# Patient Record
Sex: FEMALE | Race: WHITE
[De-identification: names, ages, dates, MRNs, and addresses within clinical notes are randomized per-mention and may not be internally consistent; named-entity substitution may affect disease eponyms.]

---

## 2017-01-08 ENCOUNTER — HOSPITAL ENCOUNTER (EMERGENCY)
Dept: HOSPITAL 17 - PHED | Age: 77
LOS: 1 days | Discharge: HOME | End: 2017-01-09
Payer: MEDICARE

## 2017-01-08 VITALS
SYSTOLIC BLOOD PRESSURE: 138 MMHG | DIASTOLIC BLOOD PRESSURE: 58 MMHG | HEART RATE: 86 BPM | OXYGEN SATURATION: 99 % | RESPIRATION RATE: 17 BRPM

## 2017-01-08 VITALS
HEART RATE: 125 BPM | RESPIRATION RATE: 18 BRPM | TEMPERATURE: 99.8 F | SYSTOLIC BLOOD PRESSURE: 131 MMHG | DIASTOLIC BLOOD PRESSURE: 76 MMHG | OXYGEN SATURATION: 96 %

## 2017-01-08 VITALS — BODY MASS INDEX: 27.06 KG/M2 | HEIGHT: 62 IN | WEIGHT: 147.05 LBS

## 2017-01-08 VITALS
RESPIRATION RATE: 16 BRPM | HEART RATE: 101 BPM | DIASTOLIC BLOOD PRESSURE: 56 MMHG | OXYGEN SATURATION: 97 % | SYSTOLIC BLOOD PRESSURE: 112 MMHG

## 2017-01-08 VITALS — OXYGEN SATURATION: 98 %

## 2017-01-08 VITALS — TEMPERATURE: 99.5 F

## 2017-01-08 DIAGNOSIS — B34.9: Primary | ICD-10-CM

## 2017-01-08 DIAGNOSIS — E86.0: ICD-10-CM

## 2017-01-08 LAB
ALP SERPL-CCNC: 53 U/L (ref 45–117)
ALT SERPL-CCNC: 30 U/L (ref 10–53)
ANION GAP SERPL CALC-SCNC: 11 MEQ/L (ref 5–15)
AST SERPL-CCNC: 26 U/L (ref 15–37)
BASOPHILS # BLD AUTO: 0 TH/MM3 (ref 0–0.2)
BASOPHILS NFR BLD: 0.3 % (ref 0–2)
BILIRUB SERPL-MCNC: 1.1 MG/DL (ref 0.2–1)
BUN SERPL-MCNC: 15 MG/DL (ref 7–18)
CHLORIDE SERPL-SCNC: 103 MEQ/L (ref 98–107)
EOSINOPHIL # BLD: 0 TH/MM3 (ref 0–0.4)
EOSINOPHIL NFR BLD: 0.3 % (ref 0–4)
ERYTHROCYTE [DISTWIDTH] IN BLOOD BY AUTOMATED COUNT: 13.9 % (ref 11.6–17.2)
GFR SERPLBLD BASED ON 1.73 SQ M-ARVRAT: 61 ML/MIN (ref 89–?)
HCO3 BLD-SCNC: 28.6 MEQ/L (ref 21–32)
HCT VFR BLD CALC: 46.5 % (ref 35–46)
HEMO FLAGS: (no result)
LYMPHOCYTES # BLD AUTO: 0.7 TH/MM3 (ref 1–4.8)
LYMPHOCYTES NFR BLD AUTO: 10.3 % (ref 9–44)
MCH RBC QN AUTO: 30.1 PG (ref 27–34)
MCHC RBC AUTO-ENTMCNC: 33 % (ref 32–36)
MCV RBC AUTO: 91.3 FL (ref 80–100)
MONOCYTES NFR BLD: 5.5 % (ref 0–8)
NEUTROPHILS # BLD AUTO: 5.7 TH/MM3 (ref 1.8–7.7)
NEUTROPHILS NFR BLD AUTO: 83.6 % (ref 16–70)
PLATELET # BLD: 206 TH/MM3 (ref 150–450)
POTASSIUM SERPL-SCNC: 3.8 MEQ/L (ref 3.5–5.1)
RBC # BLD AUTO: 5.1 MIL/MM3 (ref 4–5.3)
SODIUM SERPL-SCNC: 143 MEQ/L (ref 136–145)
WBC # BLD AUTO: 6.8 TH/MM3 (ref 4–11)

## 2017-01-08 PROCEDURE — 96374 THER/PROPH/DIAG INJ IV PUSH: CPT

## 2017-01-08 PROCEDURE — 96361 HYDRATE IV INFUSION ADD-ON: CPT

## 2017-01-08 PROCEDURE — 83690 ASSAY OF LIPASE: CPT

## 2017-01-08 PROCEDURE — 81001 URINALYSIS AUTO W/SCOPE: CPT

## 2017-01-08 PROCEDURE — 96375 TX/PRO/DX INJ NEW DRUG ADDON: CPT

## 2017-01-08 PROCEDURE — 96376 TX/PRO/DX INJ SAME DRUG ADON: CPT

## 2017-01-08 PROCEDURE — 99284 EMERGENCY DEPT VISIT MOD MDM: CPT

## 2017-01-08 PROCEDURE — 85025 COMPLETE CBC W/AUTO DIFF WBC: CPT

## 2017-01-08 PROCEDURE — 80053 COMPREHEN METABOLIC PANEL: CPT

## 2017-01-08 PROCEDURE — 87804 INFLUENZA ASSAY W/OPTIC: CPT

## 2017-01-08 NOTE — PD
HPI


Chief Complaint:  GI Complaint


Time Seen by Provider:  22:14


Travel History


International Travel<30 days:  No


Contact w/Intl Traveler<30days:  No


Traveled to known affect area:  No





History of Present Illness


HPI


The patient is a 76-year-old female that states she has been having nausea and 

vomiting since 2 PM today.  She feels that she has had a fever, she has chills 

and feels cold.  She does have generalized myalgias.  She denies any abdominal 

pain.  She denies any dysuria, frequency or urgency.  She does feel dehydrated.





PFSH


Past Medical History


Heart Rhythm Problems:  No


Cancer:  No


Cardiac Catheterization:  No


Cardiovascular Problems:  Yes (HX OF CARDIAC CATH, ? CARDIOMYOPATHY, LBBB ON EKG

)


High Cholesterol:  No


Congestive Heart Failure:  No


Diabetes:  No


Diminished Hearing:  No


Endocrine:  No


Gastrointestinal Disorders:  Yes (ACID REFLUX, CELIAC DISEASE--GLUTEN FREE)


Glaucoma:  Yes (BILATERAL )


Genitourinary:  No


Hepatitis:  No


Hiatal Hernia:  No


Hypertension:  No


Immune Disorder:  Yes (CELIAC DISEASE)


Musculoskeletal:  Yes (ARTHRITIS, HELDER HAND TRIGGER FINGER, CARPAL TUNNEL HELDER 

HANDS, HELDER SHOULDERS)


Neurologic:  No


Psychiatric:  No


Reproductive:  No


Respiratory:  No


Myocardial Infarction:  Yes


Thyroid Disease:  No


Tetanus Vaccination:  Unknown


Influenza Vaccination:  No


Pregnant?:  Not Pregnant


Menopausal:  Yes


:  2





Past Surgical History


Body Medical Devices:  SCREWS IN R KNEE REMOVED


Cholecystectomy:  Yes ()


Coronary Artery Bypass Graft:  No


Eye Surgery:  Yes (ACUTE GLAUCOMA SURGERY HELDER EYES , HELDER CATARACT SURGERY)


Joint Replacement:  No


Neurologic Surgery:  Yes (BILATERAL CARPAL TUNNEL, NEUROMA REMOVED FROM PLANTAR 

SURFACE HELDER FEET)


Thoracic Surgery:  Yes (CHEST TUBE FOR R LUNG PNEUMOTHROAX ACQUIRED DURING CPR 

WHEN PT CODED )


Other Surgery:  Yes (MECHANICAL VENTILATION, TRAUMATIC R. PNEUMOTHORAX)





Family History


Family Myocardial Infarction:  Yes (mom)





Social History


Alcohol Use:  No


Tobacco Use:  No


Substance Use:  No





Allergies-Medications


(Allergen,Severity, Reaction):  


Coded Allergies:  


     No Known Allergies (Unverified , 16)


Reported Meds & Prescriptions





Reported Meds & Active Scripts


Active


Ibuprofen 600 Mg Tab 600 Mg PO TID


Reported


Protonix (Pantoprazole Sodium) 20 Mg Tab 20 Mg PO DAILY


Vitamin D-3 (Cholecalciferol) 1,000 Unit Tab 1,000 Units PO DAILY


Lipitor (Atorvastatin Calcium) 10 Mg Tab 10 Mg PO HS








Review of Systems


Except as stated in HPI:  all other systems reviewed are Neg





Physical Exam


Narrative


GENERAL: The patient is alert, oriented 3, moderately dehydrated appearing in 

moderate apparent distress with her nausea.  Her vital signs show heart rate of 

125 with temperature 99.8.


SKIN: Warm and dry.  No skin rash is seen.


HEAD: Atraumatic. Normocephalic. 


EYES: Pupils equal and round. No scleral icterus. No injection or drainage. 


ENT: No nasal bleeding or discharge.  Mucous membranes pink and moist.  The 

throat is clear and the tympanic membranes are clear.


NECK: Trachea midline. No JVD. 


CARDIOVASCULAR: Regular rate and rhythm.  No murmur appreciated.


RESPIRATORY: No accessory muscle use. Clear to auscultation. Breath sounds 

equal bilaterally. 


GASTROINTESTINAL: Abdomen soft, non-tender, nondistended. Hepatic and splenic 

margins not palpable.  No Guarding or rebound is present.  


MUSCULOSKELETAL: No obvious deformities. No clubbing.  No cyanosis.  No edema.


NEUROLOGICAL: Awake and alert. No obvious cranial nerve deficits.  Motor 

grossly within normal limits. Normal speech.


PSYCHIATRIC: Appropriate mood and affect; insight and judgment normal.





Data


Data


Last Documented VS





Vital Signs








  Date Time  Temp Pulse Resp B/P Pulse Ox O2 Delivery O2 Flow Rate FiO2


 


17 23:49  86 17 138/58 99 Room Air  


 


17 21:55 99.8       








Orders





 Complete Blood Count With Diff (17 22:33)


Comprehensive Metabolic Panel (17 22:33)


Lipase (17 22:33)


Urinalysis - C+S If Indicated (17 22:33)


Iv Access Insert/Monitor (17 22:33)


Ecg Monitoring (17 22:33)


Oximetry (17 22:33)


Ondansetron Inj (Zofran Inj) (17 22:45)


Sodium Chlor 0.9% 1000 Ml Inj (Ns 1000 M (17 22:33)


Sodium Chloride 0.9% Flush (Ns Flush) (17 22:45)


Ketorolac Inj (Toradol Inj) (17 22:45)


Influenzae A/B Antigen (17 22:53)


Sodium Chloride 0.9% Flush (Ns Flush) (17 23:00)


Sodium Chlor 0.9% 1000 Ml Inj (Ns 1000 M (17 23:30)





Labs








 Laboratory Tests








Test 17





 22:40


 


White Blood Count 6.8 TH/MM3


 


Red Blood Count 5.10 MIL/MM3


 


Hemoglobin 15.3 GM/DL


 


Hematocrit 46.5 %


 


Mean Corpuscular Volume 91.3 FL


 


Mean Corpuscular Hemoglobin 30.1 PG


 


Mean Corpuscular Hemoglobin 33.0 %





Concent 


 


Red Cell Distribution Width 13.9 %


 


Platelet Count 206 TH/MM3


 


Mean Platelet Volume 8.8 FL


 


Neutrophils (%) (Auto) 83.6 %


 


Lymphocytes (%) (Auto) 10.3 %


 


Monocytes (%) (Auto) 5.5 %


 


Eosinophils (%) (Auto) 0.3 %


 


Basophils (%) (Auto) 0.3 %


 


Neutrophils # (Auto) 5.7 TH/MM3


 


Lymphocytes # (Auto) 0.7 TH/MM3


 


Monocytes # (Auto) 0.4 TH/MM3


 


Eosinophils # (Auto) 0.0 TH/MM3


 


Basophils # (Auto) 0.0 TH/MM3


 


CBC Comment DIFF FINAL 


 


Differential Comment  


 


Sodium Level 143 MEQ/L


 


Potassium Level 3.8 MEQ/L


 


Chloride Level 103 MEQ/L


 


Carbon Dioxide Level 28.6 MEQ/L


 


Anion Gap 11 MEQ/L


 


Blood Urea Nitrogen 15 MG/DL


 


Creatinine 0.90 MG/DL


 


Estimat Glomerular Filtration 61 ML/MIN





Rate 


 


Random Glucose 131 MG/DL


 


Calcium Level 8.9 MG/DL


 


Total Bilirubin 1.1 MG/DL


 


Aspartate Amino Transf 26 U/L





(AST/SGOT) 


 


Alanine Aminotransferase 30 U/L





(ALT/SGPT) 


 


Alkaline Phosphatase 53 U/L


 


Total Protein 7.2 GM/DL


 


Albumin 3.9 GM/DL


 


Lipase 155 U/L














MDM


Medical Decision Making


Medical Screen Exam Complete:  Yes


Emergency Medical Condition:  Yes


Medical Record Reviewed:  Yes


Interpretation(s)


The CBC is normal.  The hemoglobin is 15.3 and hematocrit 46.5 and this likely 

represents some hemoconcentration from dehydration.  The complete metabolic 

profile shows a GFR of 61, glucose 131 and total bilirubin 1.1 but is otherwise 

normal.  The lipase is normal.


Differential Diagnosis


Viral syndrome, gastroenteritis, gastritis, dehydration, electrolyte disorder, 

anemia, hypo-/hyperglycemia, colitis


Narrative Course


The patient has drank 2 glasses of water/Gatorade and is at 2 L of IV fluid.  

Despite this, she is not able to make a urine yet.  She apparently was 

dehydrated.  The hemoglobin and hematocrit likely reflect some 

hemoconcentration from dehydration.





Impression: Viral syndrome, moderate dehydration





Sepsis Criteria


SIRS Criteria (2 or more):  Heart rate over 90





Diagnosis





 Primary Impression:  


 Viral syndrome


 Additional Impression:  


 Moderate dehydration





***Additional Instructions:


Take the Motrin one tablet 3 times daily for the aches and pains.  Follow-up 

with your primary care physician this week.


***Med/Other Pt SpecificInfo:  Prescription(s) given


Scripts


Ibuprofen 600 Mg [id number] Mg PO TID  #45 TAB  Ref 0


   Prov:Estevan Huffman MD         17


Disposition:  01 DISCHARGE HOME


Condition:  Stable








Estevan Huffmna MD 2017 22:44

## 2017-01-09 VITALS — SYSTOLIC BLOOD PRESSURE: 116 MMHG | DIASTOLIC BLOOD PRESSURE: 69 MMHG

## 2017-01-09 LAB
COLOR UR: YELLOW
COMMENT (UR): (no result)
CULTURE IF INDICATED: (no result)
GLUCOSE UR STRIP-MCNC: (no result) MG/DL
HGB UR QL STRIP: (no result)
KETONES UR STRIP-MCNC: 15 MG/DL
METHOD OF COLLECTION: (no result)
MUCOUS THREADS #/AREA URNS LPF: (no result) /LPF
NITRITE UR QL STRIP: (no result)
SP GR UR STRIP: 1.01 (ref 1–1.03)
SQUAMOUS #/AREA URNS HPF: (no result) /HPF (ref 0–5)

## 2017-04-25 ENCOUNTER — HOSPITAL ENCOUNTER (OUTPATIENT)
Dept: HOSPITAL 17 - PLAB | Age: 77
End: 2017-04-25
Attending: FAMILY MEDICINE
Payer: MEDICARE

## 2017-04-25 DIAGNOSIS — R25.2: Primary | ICD-10-CM

## 2017-04-25 PROCEDURE — 82310 ASSAY OF CALCIUM: CPT

## 2017-04-25 PROCEDURE — 36415 COLL VENOUS BLD VENIPUNCTURE: CPT

## 2017-06-22 ENCOUNTER — HOSPITAL ENCOUNTER (OUTPATIENT)
Dept: HOSPITAL 17 - PLAB | Age: 77
End: 2017-06-22
Attending: FAMILY MEDICINE
Payer: MEDICARE

## 2017-06-22 DIAGNOSIS — R53.83: ICD-10-CM

## 2017-06-22 DIAGNOSIS — E78.5: ICD-10-CM

## 2017-06-22 DIAGNOSIS — R25.2: Primary | ICD-10-CM

## 2017-06-22 DIAGNOSIS — F41.9: ICD-10-CM

## 2017-06-22 DIAGNOSIS — M81.0: ICD-10-CM

## 2017-06-22 DIAGNOSIS — E55.9: ICD-10-CM

## 2017-06-22 LAB
ALP SERPL-CCNC: 60 U/L (ref 45–117)
ALT SERPL-CCNC: 29 U/L (ref 10–53)
ANION GAP SERPL CALC-SCNC: 7 MEQ/L (ref 5–15)
AST SERPL-CCNC: 26 U/L (ref 15–37)
BASOPHILS # BLD AUTO: 0 TH/MM3 (ref 0–0.2)
BASOPHILS NFR BLD: 0.5 % (ref 0–2)
BILIRUB SERPL-MCNC: 0.8 MG/DL (ref 0.2–1)
BUN SERPL-MCNC: 16 MG/DL (ref 7–18)
CHLORIDE SERPL-SCNC: 105 MEQ/L (ref 98–107)
EOSINOPHIL # BLD: 0.1 TH/MM3 (ref 0–0.4)
EOSINOPHIL NFR BLD: 2.4 % (ref 0–4)
ERYTHROCYTE [DISTWIDTH] IN BLOOD BY AUTOMATED COUNT: 14 % (ref 11.6–17.2)
GFR SERPLBLD BASED ON 1.73 SQ M-ARVRAT: 80 ML/MIN (ref 89–?)
HCO3 BLD-SCNC: 31.2 MEQ/L (ref 21–32)
HCT VFR BLD CALC: 41.3 % (ref 35–46)
HDLC SERPL-MCNC: 61.3 MG/DL (ref 40–60)
HEMO FLAGS: (no result)
LDLC SERPL-MCNC: 101 MG/DL (ref 0–99)
LYMPHOCYTES # BLD AUTO: 1.2 TH/MM3 (ref 1–4.8)
LYMPHOCYTES NFR BLD AUTO: 30.8 % (ref 9–44)
MCH RBC QN AUTO: 30.7 PG (ref 27–34)
MCHC RBC AUTO-ENTMCNC: 33.3 % (ref 32–36)
MCV RBC AUTO: 92.1 FL (ref 80–100)
MONOCYTES NFR BLD: 11.1 % (ref 0–8)
NEUTROPHILS # BLD AUTO: 2.2 TH/MM3 (ref 1.8–7.7)
NEUTROPHILS NFR BLD AUTO: 55.2 % (ref 16–70)
PLATELET # BLD: 218 TH/MM3 (ref 150–450)
POTASSIUM SERPL-SCNC: 4.2 MEQ/L (ref 3.5–5.1)
RBC # BLD AUTO: 4.49 MIL/MM3 (ref 4–5.3)
SODIUM SERPL-SCNC: 143 MEQ/L (ref 136–145)
WBC # BLD AUTO: 4 TH/MM3 (ref 4–11)

## 2017-06-22 PROCEDURE — 36415 COLL VENOUS BLD VENIPUNCTURE: CPT

## 2017-06-22 PROCEDURE — 84443 ASSAY THYROID STIM HORMONE: CPT

## 2017-06-22 PROCEDURE — 80053 COMPREHEN METABOLIC PANEL: CPT

## 2017-06-22 PROCEDURE — 82306 VITAMIN D 25 HYDROXY: CPT

## 2017-06-22 PROCEDURE — 85025 COMPLETE CBC W/AUTO DIFF WBC: CPT

## 2017-06-22 PROCEDURE — 80061 LIPID PANEL: CPT

## 2017-11-15 ENCOUNTER — HOSPITAL ENCOUNTER (OUTPATIENT)
Dept: HOSPITAL 17 - PLAB | Age: 77
End: 2017-11-15
Attending: FAMILY MEDICINE
Payer: MEDICARE

## 2017-11-15 DIAGNOSIS — R25.2: Primary | ICD-10-CM

## 2017-11-15 LAB
ANION GAP SERPL CALC-SCNC: 6 MEQ/L (ref 5–15)
BUN SERPL-MCNC: 16 MG/DL (ref 7–18)
CHLORIDE SERPL-SCNC: 104 MEQ/L (ref 98–107)
GFR SERPLBLD BASED ON 1.73 SQ M-ARVRAT: 67 ML/MIN (ref 89–?)
HCO3 BLD-SCNC: 29.7 MEQ/L (ref 21–32)
POTASSIUM SERPL-SCNC: 4 MEQ/L (ref 3.5–5.1)
SODIUM SERPL-SCNC: 140 MEQ/L (ref 136–145)

## 2017-11-15 PROCEDURE — 36415 COLL VENOUS BLD VENIPUNCTURE: CPT

## 2017-11-15 PROCEDURE — 80048 BASIC METABOLIC PNL TOTAL CA: CPT

## 2018-01-02 ENCOUNTER — HOSPITAL ENCOUNTER (OUTPATIENT)
Dept: HOSPITAL 17 - PLAB | Age: 78
End: 2018-01-02
Attending: FAMILY MEDICINE
Payer: MEDICARE

## 2018-01-02 DIAGNOSIS — E55.9: ICD-10-CM

## 2018-01-02 DIAGNOSIS — F41.9: ICD-10-CM

## 2018-01-02 DIAGNOSIS — M81.0: ICD-10-CM

## 2018-01-02 DIAGNOSIS — R53.83: ICD-10-CM

## 2018-01-02 DIAGNOSIS — E78.5: Primary | ICD-10-CM

## 2018-01-02 LAB
ALBUMIN SERPL-MCNC: 3.7 GM/DL (ref 3.4–5)
ALP SERPL-CCNC: 55 U/L (ref 45–117)
ALT SERPL-CCNC: 22 U/L (ref 10–53)
AST SERPL-CCNC: 18 U/L (ref 15–37)
BASOPHILS # BLD AUTO: 0 TH/MM3 (ref 0–0.2)
BASOPHILS NFR BLD: 0.6 % (ref 0–2)
BILIRUB SERPL-MCNC: 0.6 MG/DL (ref 0.2–1)
BUN SERPL-MCNC: 16 MG/DL (ref 7–18)
CALCIUM SERPL-MCNC: 8.8 MG/DL (ref 8.5–10.1)
CHLORIDE SERPL-SCNC: 106 MEQ/L (ref 98–107)
CHOLEST SERPL-MCNC: 185 MG/DL (ref 120–200)
CHOLESTEROL/ HDL RATIO: 2.66 RATIO
CREAT SERPL-MCNC: 0.69 MG/DL (ref 0.5–1)
EOSINOPHIL # BLD: 0.1 TH/MM3 (ref 0–0.4)
EOSINOPHIL NFR BLD: 4.1 % (ref 0–4)
ERYTHROCYTE [DISTWIDTH] IN BLOOD BY AUTOMATED COUNT: 14.4 % (ref 11.6–17.2)
GFR SERPLBLD BASED ON 1.73 SQ M-ARVRAT: 83 ML/MIN (ref 89–?)
HCO3 BLD-SCNC: 29.5 MEQ/L (ref 21–32)
HCT VFR BLD CALC: 40 % (ref 35–46)
HDLC SERPL-MCNC: 69.4 MG/DL (ref 40–60)
HGB BLD-MCNC: 13.6 GM/DL (ref 11.6–15.3)
LDLC SERPL-MCNC: 80 MG/DL (ref 0–99)
LYMPHOCYTES # BLD AUTO: 1.3 TH/MM3 (ref 1–4.8)
LYMPHOCYTES NFR BLD AUTO: 40.5 % (ref 9–44)
MCH RBC QN AUTO: 31.9 PG (ref 27–34)
MCHC RBC AUTO-ENTMCNC: 33.9 % (ref 32–36)
MCV RBC AUTO: 94.2 FL (ref 80–100)
MONOCYTE #: 0.4 TH/MM3 (ref 0–0.9)
MONOCYTES NFR BLD: 11.2 % (ref 0–8)
NEUTROPHILS # BLD AUTO: 1.4 TH/MM3 (ref 1.8–7.7)
NEUTROPHILS NFR BLD AUTO: 43.6 % (ref 16–70)
PLATELET # BLD: 230 TH/MM3 (ref 150–450)
PMV BLD AUTO: 8.1 FL (ref 7–11)
PROT SERPL-MCNC: 6.6 GM/DL (ref 6.4–8.2)
RBC # BLD AUTO: 4.25 MIL/MM3 (ref 4–5.3)
SODIUM SERPL-SCNC: 142 MEQ/L (ref 136–145)
TRIGL SERPL-MCNC: 180 MG/DL (ref 42–150)
WBC # BLD AUTO: 3.1 TH/MM3 (ref 4–11)

## 2018-01-02 PROCEDURE — 82306 VITAMIN D 25 HYDROXY: CPT

## 2018-01-02 PROCEDURE — 36415 COLL VENOUS BLD VENIPUNCTURE: CPT

## 2018-01-02 PROCEDURE — 80053 COMPREHEN METABOLIC PANEL: CPT

## 2018-01-02 PROCEDURE — 85025 COMPLETE CBC W/AUTO DIFF WBC: CPT

## 2018-01-02 PROCEDURE — 84443 ASSAY THYROID STIM HORMONE: CPT

## 2018-01-02 PROCEDURE — 80061 LIPID PANEL: CPT

## 2018-04-03 ENCOUNTER — HOSPITAL ENCOUNTER (OUTPATIENT)
Dept: HOSPITAL 17 - PLAB | Age: 78
End: 2018-04-03
Attending: FAMILY MEDICINE
Payer: MEDICARE

## 2018-04-03 DIAGNOSIS — R53.83: Primary | ICD-10-CM

## 2018-04-03 DIAGNOSIS — M35.00: ICD-10-CM

## 2018-04-03 DIAGNOSIS — Z87.19: ICD-10-CM

## 2018-04-03 LAB
ALBUMIN SERPL-MCNC: 3.9 GM/DL (ref 3.4–5)
ALP SERPL-CCNC: 64 U/L (ref 45–117)
ALT SERPL-CCNC: 29 U/L (ref 10–53)
AST SERPL-CCNC: 23 U/L (ref 15–37)
BILIRUB SERPL-MCNC: 0.9 MG/DL (ref 0.2–1)
BUN SERPL-MCNC: 22 MG/DL (ref 7–18)
CALCIUM SERPL-MCNC: 9.3 MG/DL (ref 8.5–10.1)
CHLORIDE SERPL-SCNC: 104 MEQ/L (ref 98–107)
CREAT SERPL-MCNC: 0.83 MG/DL (ref 0.5–1)
CRP SERPL-MCNC: (no result) MG/DL (ref 0–0.3)
GFR SERPLBLD BASED ON 1.73 SQ M-ARVRAT: 67 ML/MIN (ref 89–?)
GLUCOSE SERPL-MCNC: 82 MG/DL (ref 74–106)
HCO3 BLD-SCNC: 29.6 MEQ/L (ref 21–32)
PROT SERPL-MCNC: 7.2 GM/DL (ref 6.4–8.2)
SODIUM SERPL-SCNC: 140 MEQ/L (ref 136–145)

## 2018-04-03 PROCEDURE — 85652 RBC SED RATE AUTOMATED: CPT

## 2018-04-03 PROCEDURE — 86039 ANTINUCLEAR ANTIBODIES (ANA): CPT

## 2018-04-03 PROCEDURE — 86140 C-REACTIVE PROTEIN: CPT

## 2018-04-03 PROCEDURE — 80053 COMPREHEN METABOLIC PANEL: CPT

## 2018-04-03 PROCEDURE — 36415 COLL VENOUS BLD VENIPUNCTURE: CPT

## 2018-04-03 PROCEDURE — 86038 ANTINUCLEAR ANTIBODIES: CPT

## 2018-04-06 LAB — ANA PATTERN: (no result)

## 2018-04-09 ENCOUNTER — HOSPITAL ENCOUNTER (OUTPATIENT)
Dept: HOSPITAL 17 - PLAB | Age: 78
End: 2018-04-09
Attending: FAMILY MEDICINE
Payer: MEDICARE

## 2018-04-09 DIAGNOSIS — R68.2: Primary | ICD-10-CM

## 2018-04-09 DIAGNOSIS — R76.0: ICD-10-CM

## 2018-04-09 PROCEDURE — 86255 FLUORESCENT ANTIBODY SCREEN: CPT

## 2018-04-09 PROCEDURE — 86431 RHEUMATOID FACTOR QUANT: CPT

## 2018-04-09 PROCEDURE — 86225 DNA ANTIBODY NATIVE: CPT

## 2018-04-09 PROCEDURE — 86235 NUCLEAR ANTIGEN ANTIBODY: CPT

## 2018-04-09 PROCEDURE — 83516 IMMUNOASSAY NONANTIBODY: CPT

## 2018-04-09 PROCEDURE — 36415 COLL VENOUS BLD VENIPUNCTURE: CPT

## 2018-04-09 PROCEDURE — 86160 COMPLEMENT ANTIGEN: CPT

## 2018-04-12 LAB
C3 SERPL-MCNC: 118 MG/DL (ref 83–193)
C4 SERPL-MCNC: 34 MG/DL (ref 15–57)
ENA SCL70 AB SER IA-ACNC: (no result) AI
Lab: (no result) AI
Lab: <1 IU/ML
MITOCHONDRIAL AB TITER: (no result)
MITOCHONDRIAL AB: NEGATIVE
RHEUMATOID FACT SERPL-ACNC: 12 IU/ML (ref ?–14)
RIBOSOMAL P AB: (no result) AI
SM AB: (no result) AI
SMA IGG SER-ACNC: <20 U

## 2018-06-26 ENCOUNTER — HOSPITAL ENCOUNTER (OUTPATIENT)
Dept: HOSPITAL 17 - PLAB | Age: 78
End: 2018-06-26
Attending: FAMILY MEDICINE
Payer: MEDICARE

## 2018-06-26 DIAGNOSIS — R25.2: Primary | ICD-10-CM

## 2018-06-26 PROCEDURE — 82310 ASSAY OF CALCIUM: CPT

## 2018-06-26 PROCEDURE — 36415 COLL VENOUS BLD VENIPUNCTURE: CPT
